# Patient Record
Sex: FEMALE | Race: WHITE
[De-identification: names, ages, dates, MRNs, and addresses within clinical notes are randomized per-mention and may not be internally consistent; named-entity substitution may affect disease eponyms.]

---

## 2017-06-25 ENCOUNTER — HOSPITAL ENCOUNTER (EMERGENCY)
Dept: HOSPITAL 5 - ED | Age: 27
Discharge: HOME | End: 2017-06-25
Payer: MEDICARE

## 2017-06-25 VITALS — DIASTOLIC BLOOD PRESSURE: 89 MMHG | SYSTOLIC BLOOD PRESSURE: 132 MMHG

## 2017-06-25 VITALS — DIASTOLIC BLOOD PRESSURE: 82 MMHG | SYSTOLIC BLOOD PRESSURE: 139 MMHG

## 2017-06-25 DIAGNOSIS — K02.9: Primary | ICD-10-CM

## 2017-06-25 DIAGNOSIS — K05.10: ICD-10-CM

## 2017-06-25 PROCEDURE — 99282 EMERGENCY DEPT VISIT SF MDM: CPT

## 2017-06-25 NOTE — EMERGENCY DEPARTMENT REPORT
HPI





- General


Chief Complaint: Dental/Oral


Time Seen by Provider: 17 21:14





- HPI


HPI: 


The patient is a 27 -year-old male who presents to ED complaining  of 10/10 

pain in the right side of his mouth x 4 days .  Patient states that the pain 

started 4 days ago and has increased in severity over the last 2-3 days.  The 

pain is exacerbated by eating and opening of the mouth.


Patient states the pain is alleviated initially with pain medication but comes 

back.


Patient states that it radiates towards  ear.  Patient describes a as a 

throbbing, pressure-like sensation.  Patient states otherwise well and has no 

other complaints.


Patient has had no fevers and no chills. No chest pain, no shortness of breath. 

No abdominal pain. No  shortness of breath or  recent trauma to the face. 








ED Past Medical Hx





- Past Medical History


Previous Medical History?: No


Additional medical history: child birth x 2





- Surgical History


Additional Surgical History:  x2





- Social History


Smoking Status: Never Smoker


Substance Use Type: None





- Medications


Home Medications: 


 Home Medications











 Medication  Instructions  Recorded  Confirmed  Last Taken  Type


 


Acetaminophen/Codeine [Tylenol 1 tab PO Q6H PRN #14 tab 17  Unknown Rx





/Codeine # 3 tab]     


 


Amoxicillin 500 mg PO BID #20 capsule 17  Unknown Rx


 


traMADol [Ultram] 50 mg PO Q6HR PRN #8 tablet 17  Unknown Rx














ED Review of Systems


ROS: 


Stated complaint: TOOTHACHE


Other details as noted in HPI





Constitutional: denies: chills, fever


Eyes: denies: eye pain, eye discharge, vision change


ENT: dental pain.  denies: ear pain, throat pain, hearing loss, epistaxis


Respiratory: denies: cough, shortness of breath, wheezing


Cardiovascular: denies: chest pain, palpitations


Endocrine: no symptoms reported


Gastrointestinal: denies: abdominal pain, nausea, diarrhea


Genitourinary: denies: urgency, dysuria, discharge


Musculoskeletal: denies: back pain, joint swelling, arthralgia


Skin: denies: rash, lesions


Neurological: denies: headache, weakness, paresthesias


Psychiatric: denies: anxiety, depression


Hematological/Lymphatic: denies: easy bleeding, easy bruising





Physical Exam





- Physical Exam


Vital Signs: 


 Vital Signs











  17





  19:29


 


Temperature 99.2 F


 


Pulse Rate 112 H


 


Respiratory 18





Rate 


 


Blood Pressure 139/82


 


O2 Sat by Pulse 98





Oximetry 











Physical Exam: 





GENERAL: Alert and oriented x3, no apparent distress, Normal Gait, atraumatic.


HEAD: Head is normocephalic and a-traumatic.





NOSE: Nose symetrical, Nontender,Nares appeared normal.


MOUTH:Mouth is well hydrated and without lesions. Tonsils nonerythematous or 

swollen,  Uvula midline, Tongue not elevated. Mucous membranes are moist. 

Posterior pharynx clear, no exudate or lesions. Patent airways.  Dental caries 

on tooth #18, 19, 14 and 15.  No gingival enlargement, no bleeding


NECK: Supple. Non edematous, No carotid bruits.  No lymphadenopathy or 

thyromegaly.  No C-spine tenderness


LUNGS: Symetrical with respiration, No wheezing, no rales or crackles, CTAB.


HEART:  S1, S2 present, regular rate and rhythm without murmur, no rubs, no 

gallops. Non tender to palpation





SKIN:  Warm and dry, No lesions, No ulceration or induration present.











ED Course


 Vital Signs











  17





  19:29


 


Temperature 99.2 F


 


Pulse Rate 112 H


 


Respiratory 18





Rate 


 


Blood Pressure 139/82


 


O2 Sat by Pulse 98





Oximetry 














ED Medical Decision Making





- Medical Decision Making


27 year-old female who presents with left-sided Facial pain secondary to 

odontogenic caries


ED course:  Based upon  history and physical examination,  pain is a result of 

an infection of tooth number 14, 15, 18,19 and that the pain she feels on the 

right side of his face and towards the ear is referred pain from this 

infectious process.  


She has no evidence of acute impending airway compromise.


At this point, patient will be discharged home on some antibiotics and pain 

trial,  she will do well with an outpatient course of antibiotics. 


Follow up with the Dental Clinic as referred.


Vital signs are stable patient is in no acute distress





Critical care attestation.: 


If time is entered above; I have spent that time in minutes in the direct care 

of this critically ill patient, excluding procedure time.








ED Disposition


Clinical Impression: 


 Tooth pain, Dental caries





Disposition: - TO HOME OR SELFCARE


Is pt being admited?: No


Does the pt Need Aspirin: No


Condition: Stable


Instructions:  Toothache (ED), Dental Caries (ED)


Prescriptions: 


Acetaminophen/Codeine [Tylenol /Codeine # 3 tab] 1 tab PO Q6H PRN #14 tab


 PRN Reason: Pain


Amoxicillin 500 mg PO BID #20 capsule


Referrals: 


PRIMARY CARE,MD [Primary Care Provider] - 3-5 Days


[facility] [Outside] - 3-5 Days


[facility] [Outside] - 3-5 Days


Forms:  Work/School Release Form(ED)


Time of Disposition: 21:31

## 2017-06-25 NOTE — EMERGENCY DEPARTMENT REPORT
ED ENT HPI





- General


Chief complaint: Dental/Oral


Stated complaint: TOOTHACHE


Time Seen by Provider: 17 12:00


Source: patient


Mode of arrival: Ambulatory


Limitations: No Limitations





- History of Present Illness


Initial comments: 





This is a 27-year-old female that presents with dental caries and pain x3-4 

days. Patient stated has been taking OTC Tyenol, Motrin, and Kayleigh with no 

relief. Patient describes symptoms as aching with level of 10/10. Patient 

denies fever, chills, pus, drainage, drooling, difficulty swallowing, shortness 

of breath, chest pain, numbness, tingling, headache.  Patient has a history of 

dental caries but due moving patient unable to see a dentist.  Patient denies 

any allergies.  Denies significant past medical history.


MD complaint: tooth pain


-: Gradual, days(s) (3-4)


Severity: moderate


Severity scale (0 -10): 10


Quality: aching


Consistency: constant


Improves with: none


Worsens with: none


Context- Dental: history of dental caries, poor dental care


Associated Symptoms: toothache.  denies: fever, cough, gum swelling, pain with 

swallowing, sore throat, tinnitus, hearing loss, discharge from ear, rhinorrhea





- Related Data


 Previous Rx's











 Medication  Instructions  Recorded  Last Taken  Type


 


Amoxicillin 500 mg PO BID #20 capsule 17 Unknown Rx


 


traMADol [Ultram] 50 mg PO Q6HR PRN #8 tablet 17 Unknown Rx











 Allergies











Allergy/AdvReac Type Severity Reaction Status Date / Time


 


No Known Allergies Allergy   Unverified 17 10:04














ED Dental HPI





- General


Chief complaint: Dental/Oral


Stated complaint: TOOTHACHE


Time Seen by Provider: 17 12:00


Source: patient


Mode of arrival: Ambulatory


Limitations: No Limitations





- Related Data


 Previous Rx's











 Medication  Instructions  Recorded  Last Taken  Type


 


Amoxicillin 500 mg PO BID #20 capsule 17 Unknown Rx


 


traMADol [Ultram] 50 mg PO Q6HR PRN #8 tablet 17 Unknown Rx











 Allergies











Allergy/AdvReac Type Severity Reaction Status Date / Time


 


No Known Allergies Allergy   Unverified 17 10:04














ED Review of Systems


ROS: 


Stated complaint: TOOTHACHE


Other details as noted in HPI





Constitutional: denies: chills, fever


Eyes: denies: eye pain, eye discharge, vision change


ENT: denies: ear pain, throat pain


Respiratory: denies: cough, shortness of breath, wheezing


Cardiovascular: denies: chest pain, palpitations


Endocrine: no symptoms reported


Gastrointestinal: denies: abdominal pain, nausea, diarrhea


Genitourinary: denies: urgency, dysuria, discharge


Musculoskeletal: denies: back pain, joint swelling, arthralgia


Skin: denies: rash, lesions


Neurological: denies: headache, weakness, paresthesias


Psychiatric: denies: anxiety, depression


Hematological/Lymphatic: denies: easy bleeding, easy bruising





ED Past Medical Hx





- Past Medical History


Previous Medical History?: Yes


Additional medical history: child birth x 2





- Surgical History


Past Surgical History?: Yes


Additional Surgical History:  x2





- Social History


Smoking Status: Never Smoker


Substance Use Type: Non Opiate Pain, Other





- Medications


Home Medications: 


 Home Medications











 Medication  Instructions  Recorded  Confirmed  Last Taken  Type


 


Amoxicillin 500 mg PO BID #20 capsule 17  Unknown Rx


 


traMADol [Ultram] 50 mg PO Q6HR PRN #8 tablet 17  Unknown Rx














ED Physical Exam





- General


Limitations: No Limitations


General appearance: alert, in no apparent distress





- Head


Head exam: Present: atraumatic, normocephalic, normal inspection





- Eye


Eye exam: Present: normal appearance, PERRL, EOMI.  Absent: scleral icterus, 

conjunctival injection, nystagmus, periorbital swelling, periorbital tenderness


Pupils: Present: normal accommodation





- ENT


ENT exam: Present: normal exam, normal orophraynx, mucous membranes moist, TM's 

normal bilaterally, normal external ear exam





- Expanded ENT Exam


  ** Expanded


Mouth exam: Present: normal external inspection, tongue normal.  Absent: 

drooling, trismus, muffled voice, tongue elevation, laceration


Teeth exam: Present: dental caries, dental tenderness # (13,19,20,21), gingival 

enlargement


Throat exam: Positive: normal inspection.  Negative: tonsillar erythema, 

tonsillomegaly, tonsillar exudate, R peritonsillar mass, L peritonsillar mass





- Neck


Neck exam: Present: normal inspection, full ROM.  Absent: tenderness, 

meningismus, lymphadenopathy, thyromegaly





- Respiratory


Respiratory exam: Present: normal lung sounds bilaterally.  Absent: respiratory 

distress, wheezes, rales, rhonchi, stridor, chest wall tenderness, accessory 

muscle use, decreased breath sounds, prolonged expiratory





- Cardiovascular


Cardiovascular Exam: Present: regular rate, normal rhythm, normal heart sounds.

  Absent: bradycardia, tachycardia, irregular rhythm, systolic murmur, 

diastolic murmur, rubs, gallop





- GI/Abdominal


GI/Abdominal exam: Present: soft, normal bowel sounds.  Absent: distended, 

tenderness, guarding, rebound, rigid, diminished bowel sounds





- Rectal


Rectal exam: Present: deferred, normal rectal tone (as per pt)





- Extremities Exam


Extremities exam: Present: normal inspection, full ROM, normal capillary 

refill.  Absent: tenderness, pedal edema, joint swelling, calf tenderness





- Back Exam


Back exam: Present: normal inspection, full ROM.  Absent: tenderness, CVA 

tenderness (R), CVA tenderness (L), muscle spasm, paraspinal tenderness, 

vertebral tenderness, rash noted





- Neurological Exam


Neurological exam: Present: alert, oriented X3, CN II-XII intact, normal gait





- Psychiatric


Psychiatric exam: Present: normal affect, normal mood





- Skin


Skin exam: Present: warm, dry, intact, normal color.  Absent: rash





ED Course





 Vital Signs











  17





  10:05


 


Temperature 98.4 F


 


Pulse Rate 106 H


 


Respiratory 20





Rate 


 


Blood Pressure 132/89


 


O2 Sat by Pulse 100





Oximetry 














ED Medical Decision Making





- Medical Decision Making





Ed course: This is a 27-year-old female that presents with dental caries and 

gingivitis





1- after my physical exam, patient was prescribed Amox and Ultram at d/c.  

Patient was instructed not to operate heavy machinery while taking Ultram due 

to sedation/drowsiness.


2.  Was also referred to a dentist and was instructed to follow-up in 24 hours


3- at time time of discharge, the patient does not seem toxic or ill in 

appearance.  No acute signs of distress noted.  Patient agrees to discharge 

treatment plan of care.  No further questions noted by the patient. 


Critical care attestation.: 


If time is entered above; I have spent that time in minutes in the direct care 

of this critically ill patient, excluding procedure time.








ED Disposition


Clinical Impression: 


 Dental caries, Gingivitis





Disposition:  TO HOME OR SELFCARE


Is pt being admited?: No


Does the pt Need Aspirin: No


Condition: Stable


Instructions:  Dental Caries (ED), Gingivitis (ED), Tramadol (By mouth), 

Amoxicillin (By mouth)


Additional Instructions: 


Follow-up with the dentist in 24 hours.


Take amoxicillin and Ultram as prescribed.  Do not operate heavy machinery 

while taking Ultram due to sedation/drowsiness.


Prescriptions: 


Amoxicillin 500 mg PO BID #20 capsule


traMADol [Ultram] 50 mg PO Q6HR PRN #8 tablet


 PRN Reason: Pain


Referrals: 


PRIMARY CARE,MD [Primary Care Provider] - 3-5 Days


[facility] [Outside] - 3-5 Days


RADHA DORANTES JR, MD [Staff Physician] - 3-5 Days


[facility] [Outside] - 24 Hours


Forms:  Work/School Release Form(ED)

## 2018-05-03 ENCOUNTER — HOSPITAL ENCOUNTER (EMERGENCY)
Dept: HOSPITAL 5 - ED | Age: 28
Discharge: HOME | End: 2018-05-03
Payer: MEDICARE

## 2018-05-03 VITALS — DIASTOLIC BLOOD PRESSURE: 72 MMHG | SYSTOLIC BLOOD PRESSURE: 118 MMHG

## 2018-05-03 DIAGNOSIS — S61.001A: Primary | ICD-10-CM
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
## 2018-05-03 PROCEDURE — 99282 EMERGENCY DEPT VISIT SF MDM: CPT

## 2018-05-03 NOTE — EMERGENCY DEPARTMENT REPORT
ED Extremity Problem HPI





- General


Chief complaint: Extremity Injury, Upper


Stated complaint: R THUMBNAIL GONE


Time Seen by Provider: 18 13:49


Source: patient


Mode of arrival: Ambulatory


Limitations: No Limitations





- History of Present Illness


Initial comments: 





Patient is a 28-year-old  female who 4 days ago hit her right thumb 

which had acrylic nails on and the nail came off and took her nail off with it.

  Patient has some bleeding she wrapped it with a Band-Aid has had continued 

pain.  Patient states Tylenol is not helping.  Patient states that the pain is 

8 out of 10 severity is directly at the thumb.  Patient denies any fevers 

chills or purulent drainage to list at this time.





- Related Data


 Previous Rx's











 Medication  Instructions  Recorded  Last Taken  Type


 


Acetaminophen/Codeine [Tylenol 1 tab PO Q6H PRN #14 tab 17 Unknown Rx





/Codeine # 3 tab]    


 


Amoxicillin 500 mg PO BID #20 capsule 17 Unknown Rx


 


traMADol [Ultram] 50 mg PO Q6HR PRN #8 tablet 17 Unknown Rx











 Allergies











Allergy/AdvReac Type Severity Reaction Status Date / Time


 


No Known Allergies Allergy   Unverified 17 10:04














ED Review of Systems


ROS: 


Stated complaint: R THUMBNAIL GONE


Other details as noted in HPI





Comment: All other systems reviewed and negative





ED Past Medical Hx





- Past Medical History


Previous Medical History?: No


Additional medical history: child birth x 2





- Surgical History


Past Surgical History?: No


Additional Surgical History:  x2





- Social History


Smoking Status: Never Smoker


Substance Use Type: None





- Medications


Home Medications: 


 Home Medications











 Medication  Instructions  Recorded  Confirmed  Last Taken  Type


 


Acetaminophen/Codeine [Tylenol 1 tab PO Q6H PRN #14 tab 17  Unknown Rx





/Codeine # 3 tab]     


 


Amoxicillin 500 mg PO BID #20 capsule 17  Unknown Rx


 


traMADol [Ultram] 50 mg PO Q6HR PRN #8 tablet 17  Unknown Rx














ED Physical Exam





- General


Limitations: No Limitations


General appearance: alert, in no apparent distress





- Head


Head exam: Present: atraumatic, normocephalic





- Eye


Eye exam: Present: normal appearance





- ENT


ENT exam: Present: mucous membranes moist





- Neck


Neck exam: Present: normal inspection





- Respiratory


Respiratory exam: Present: normal lung sounds bilaterally.  Absent: respiratory 

distress





- Cardiovascular


Cardiovascular Exam: Present: regular rate, normal rhythm.  Absent: systolic 

murmur, diastolic murmur, rubs, gallop





- GI/Abdominal


GI/Abdominal exam: Present: soft, normal bowel sounds





- Extremities Exam


Extremities exam: Present: normal inspection





- Back Exam


Back exam: Present: normal inspection





- Neurological Exam


Neurological exam: Present: alert, oriented X3





- Psychiatric


Psychiatric exam: Present: normal affect, normal mood





- Skin


Skin exam: Present: warm, dry, intact, normal color, other (patient's right 

thumb shows where the nail has been partially removed.  There is some dried 

blood at the nailbed.  There is no purulent drainage there is no tenderness on 

the pad side of the finger).  Absent: rash





ED Course





 Vital Signs











  18





  13:14


 


Temperature 98.0 F


 


Pulse Rate 90


 


Respiratory 16





Rate 


 


Blood Pressure 118/72


 


O2 Sat by Pulse 98





Oximetry 














ED Medical Decision Making





- Medical Decision Making





She was encouraged to take Motrin instead of Tylenol for pain and inflammation.

  Patient told to keep the wound clean with soap and water and also put 

Neosporin daily patient will be discharged home


Critical care attestation.: 


If time is entered above; I have spent that time in minutes in the direct care 

of this critically ill patient, excluding procedure time.








ED Disposition


Clinical Impression: 


 Nailbed avulsion





Disposition: - TO HOME OR SELFCARE


Is pt being admited?: No


Does the pt Need Aspirin: No


Condition: Stable


Instructions:  Acute Wound Care (ED)


Additional Instructions: 


Esequiel's take over-the-counter Motrin as, 600 mg, every 6 hours for pain


Referrals: 


PRIMARY CARE,MD [Primary Care Provider] - 3-5 Days